# Patient Record
Sex: MALE | Race: BLACK OR AFRICAN AMERICAN
[De-identification: names, ages, dates, MRNs, and addresses within clinical notes are randomized per-mention and may not be internally consistent; named-entity substitution may affect disease eponyms.]

---

## 2017-10-25 ENCOUNTER — HOSPITAL ENCOUNTER (EMERGENCY)
Dept: HOSPITAL 61 - ER | Age: 27
Discharge: HOME | End: 2017-10-25
Payer: SELF-PAY

## 2017-10-25 VITALS — SYSTOLIC BLOOD PRESSURE: 120 MMHG | DIASTOLIC BLOOD PRESSURE: 78 MMHG

## 2017-10-25 DIAGNOSIS — F11.10: Primary | ICD-10-CM

## 2017-10-25 LAB
ANION GAP SERPL CALC-SCNC: 7 MMOL/L (ref 6–14)
BUN SERPL-MCNC: 16 MG/DL (ref 8–26)
CALCIUM SERPL-MCNC: 9.6 MG/DL (ref 8.5–10.1)
CHLORIDE SERPL-SCNC: 101 MMOL/L (ref 98–107)
CO2 SERPL-SCNC: 29 MMOL/L (ref 21–32)
CREAT SERPL-MCNC: 1 MG/DL (ref 0.7–1.3)
GFR SERPLBLD BASED ON 1.73 SQ M-ARVRAT: 108.5 ML/MIN
GLUCOSE SERPL-MCNC: 98 MG/DL (ref 70–99)
POTASSIUM SERPL-SCNC: 4 MMOL/L (ref 3.5–5.1)
SODIUM SERPL-SCNC: 137 MMOL/L (ref 136–145)

## 2017-10-25 PROCEDURE — 80048 BASIC METABOLIC PNL TOTAL CA: CPT

## 2017-10-25 PROCEDURE — 99284 EMERGENCY DEPT VISIT MOD MDM: CPT

## 2017-10-25 PROCEDURE — 96360 HYDRATION IV INFUSION INIT: CPT

## 2017-10-25 PROCEDURE — 36415 COLL VENOUS BLD VENIPUNCTURE: CPT

## 2017-10-25 NOTE — PHYS DOC
Past Medical History


Past Medical History:  No Pertinent History


Past Surgical History:  No Surgical History


Alcohol Use:  None


Drug Use:  Opiates





Adult General


Chief Complaint


Chief Complaint:  TREMORS





HPI


HPI





27-year-old male complaining of opiate withdrawal. Patient states he started 

taking recreational over-the-counter narcotics about a year ago. Recently he 

has tried to stop but he states while trying to stop he was forced to shoot up 

some dope as well as snort some heroin. Patient is not depressed or suicidal. 

Suspect recreational drug use for him and he has no intent to hurt himself. He 

states when he begins to withdrawal it's uncomfortable. He has not sought any 

outpatient detox therapy or treatment. Patient has no fevers chills sweats or 

shaking chills. No headache or stiff neck. He has no other complaints. Patient 

states he had some nausea and was diaphoretic earlier but that's now resolved. 

No chest pain or shortness of air. Patient currently has no complaints.





Review of Systems


Review of Systems





Constitutional: Denies fever or chills []


Eyes: Denies change in visual acuity, redness, or eye pain []


HENT: Denies nasal congestion or sore throat []


Respiratory: Denies cough or shortness of breath []


Cardiovascular: No additional information not addressed in HPI []


GI: Denies abdominal pain, nausea, vomiting, bloody stools or diarrhea []


: Denies dysuria or hematuria []


Musculoskeletal: Denies back pain or joint pain []


Integument: Denies rash or skin lesions []


Neurologic: Denies headache, focal weakness or sensory changes []


Endocrine: Denies polyuria or polydipsia []





Current Medications


Current Medications





Current Medications








 Medications


  (Trade)  Dose


 Ordered  Sig/[facility]  Start Time


 Stop Time Status Last Admin


Dose Admin


 


 Sodium Chloride  1,000 ml @ 


 999 mls/hr  1X  ONCE  10/25/17 12:45


 10/25/17 13:45 DC 10/25/17 13:04


999 MLS/HR











Allergies


Allergies





Allergies








Coded Allergies Type Severity Reaction Last Updated Verified


 


  No Known Drug Allergies    6/7/15 No











Physical Exam


Physical Exam





Constitutional: Well developed, well nourished, no acute distress, non-toxic 

appearance. []


HENT: Normocephalic, atraumatic, bilateral external ears normal, oropharynx 

moist, no oral exudates, nose normal. []


Eyes: PERRLA, EOMI, conjunctiva normal, no discharge. [] 


Neck: Normal range of motion, no tenderness, supple, no stridor. [] 


Cardiovascular:Heart rate regular rhythm, no murmur []


Lungs & Thorax:  Bilateral breath sounds clear to auscultation []


Abdomen: Bowel sounds normal, soft, no tenderness, no masses, no pulsatile 

masses. [] 


Skin: Warm, dry, no erythema, no rash. [] 


Back: No tenderness, no CVA tenderness. [] 


Extremities: No tenderness, no cyanosis, no clubbing, ROM intact, no edema. [] 


Neurologic: Alert and oriented X 3, normal motor function, normal sensory 

function, no focal deficits noted. []


Psychologic: Affect normal, judgement normal, mood normal. []





Current Patient Data


Vital Signs





 Vital Signs








  Date Time  Temp Pulse Resp B/P (MAP) Pulse Ox O2 Delivery O2 Flow Rate FiO2


 


10/25/17 12:32 98.4 68 16 105/82 (90) 99 Room Air  





 98.4       








Lab Values





 Laboratory Tests








Test


  10/25/17


12:40


 


Sodium Level


  137 mmol/L


(136-145)


 


Potassium Level


  4.0 mmol/L


(3.5-5.1)


 


Chloride Level


  101 mmol/L


()


 


Carbon Dioxide Level


  29 mmol/L


(21-32)


 


Anion Gap 7 (6-14)  


 


Blood Urea Nitrogen


  16 mg/dL


(8-26)


 


Creatinine


  1.0 mg/dL


(0.7-1.3)


 


Estimated GFR


(Cockcroft-Gault) 108.5  


 


 


Glucose Level


  98 mg/dL


(70-99)


 


Calcium Level


  9.6 mg/dL


(8.5-10.1)





 Laboratory Tests


10/25/17 12:40














EKG


EKG


[]





Radiology/Procedures


Radiology/Procedures


[]





Course & Med Decision Making


Course & Med Decision Making





Signs and symptoms consistent with intermittent manifestations of opiate 

withdrawal in a patient chronically addicted to opiates. He is not suicidal or 

depressed and has never overdosed intentionally nor does he intend to. There is 

no indication for emergent psychiatric evaluation or hold. 1 L of IV fluids 

given and basic metabolic panel unremarkable. No further workup or treatment 

indicated. Patient aware to follow-up with his primary care doctor and pursue 

outpatient drug treatment if he so desires. Patient agrees with outpatient 

follow-up and strict return precautions given





Pertinent Labs and Imaging studies reviewed. (See chart for details)





[]





Dragon Disclaimer


Dragon Disclaimer


This electronic medical record was generated, in whole or in part, using a 

voice recognition dictation system.





Departure


Departure


Impression:  


 Primary Impression:  


 Opioid abuse


Disposition:  01 HOME, SELF-CARE


Condition:  STABLE


Referrals:  


NO PCP (PCP)


Patient Instructions:  Drug Abuse and Addiction-[facility], Drug Abuse, FAQs





Additional Instructions:  


You have been experiencing intermittent symptoms of opioid withdrawal syndrome. 

While this can be profoundly uncomfortable is typically not threatening. Rest 

and drink plenty of fluids. Follow-up with your doctor today or tomorrow for 

reevaluation and pursue outpatient drug treatment at that facility of your 

choice if desired. Return immediately for new severe worsening symptoms











PARVEEN SANCHEZ MD Oct 25, 2017 14:08

## 2018-08-06 ENCOUNTER — HOSPITAL ENCOUNTER (EMERGENCY)
Dept: HOSPITAL 61 - ER | Age: 28
Discharge: HOME | End: 2018-08-06
Payer: SELF-PAY

## 2018-08-06 DIAGNOSIS — K08.89: Primary | ICD-10-CM

## 2018-08-06 DIAGNOSIS — R51: ICD-10-CM

## 2018-08-06 PROCEDURE — 96372 THER/PROPH/DIAG INJ SC/IM: CPT

## 2018-08-06 PROCEDURE — 99283 EMERGENCY DEPT VISIT LOW MDM: CPT

## 2018-08-06 RX ADMIN — PENICILLIN V POTASSIUM 1 MG: 250 TABLET ORAL at 01:40

## 2018-08-06 RX ADMIN — KETOROLAC TROMETHAMINE 1 MG: 30 INJECTION, SOLUTION INTRAMUSCULAR at 01:41

## 2018-08-06 RX ADMIN — DEXAMETHASONE 1 MG: 4 TABLET ORAL at 01:40
